# Patient Record
Sex: MALE
[De-identification: names, ages, dates, MRNs, and addresses within clinical notes are randomized per-mention and may not be internally consistent; named-entity substitution may affect disease eponyms.]

---

## 2020-03-07 ENCOUNTER — HOSPITAL ENCOUNTER (EMERGENCY)
Dept: HOSPITAL 9 - MADERS | Age: 75
Discharge: HOME | End: 2020-03-07
Payer: MEDICARE

## 2020-03-07 DIAGNOSIS — E03.9: ICD-10-CM

## 2020-03-07 DIAGNOSIS — K59.00: Primary | ICD-10-CM

## 2020-03-07 DIAGNOSIS — E11.65: ICD-10-CM

## 2020-03-07 LAB
ALBUMIN SERPL BCG-MCNC: 4.4 G/DL (ref 3.4–4.8)
ALP SERPL-CCNC: 67 U/L (ref 40–110)
ALT SERPL W P-5'-P-CCNC: 78 U/L (ref 8–55)
ANION GAP SERPL CALC-SCNC: 18 MMOL/L (ref 10–20)
AST SERPL-CCNC: 65 U/L (ref 5–34)
BACTERIA UR QL AUTO: (no result) HPF
BASOPHILS # BLD AUTO: 0.1 THOU/UL (ref 0–0.2)
BASOPHILS NFR BLD AUTO: 1.2 % (ref 0–1)
BILIRUB SERPL-MCNC: 0.4 MG/DL (ref 0.2–1.2)
BUN SERPL-MCNC: 14 MG/DL (ref 8.4–25.7)
CALCIUM SERPL-MCNC: 9.2 MG/DL (ref 7.8–10.44)
CHLORIDE SERPL-SCNC: 96 MMOL/L (ref 98–107)
CO2 SERPL-SCNC: 22 MMOL/L (ref 23–31)
CREAT CL PREDICTED SERPL C-G-VRATE: 0 ML/MIN (ref 70–130)
EOSINOPHIL # BLD AUTO: 0.1 THOU/UL (ref 0–0.7)
EOSINOPHIL NFR BLD AUTO: 1.4 % (ref 0–10)
GLOBULIN SER CALC-MCNC: 3.2 G/DL (ref 2.4–3.5)
GLUCOSE SERPL-MCNC: 412 MG/DL (ref 83–110)
GLUCOSE UR STRIP-MCNC: >=1000 MG/DL
HGB BLD-MCNC: 12.5 G/DL (ref 14–18)
LYMPHOCYTES # BLD AUTO: 1.3 THOU/UL (ref 1.2–3.4)
LYMPHOCYTES NFR BLD AUTO: 15.5 % (ref 21–51)
MCH RBC QN AUTO: 31.8 PG (ref 27–31)
MCV RBC AUTO: 96.3 FL (ref 78–98)
MONOCYTES # BLD AUTO: 0.4 THOU/UL (ref 0.11–0.59)
MONOCYTES NFR BLD AUTO: 4.7 % (ref 0–10)
NEUTROPHILS # BLD AUTO: 6.3 THOU/UL (ref 1.4–6.5)
NEUTROPHILS NFR BLD AUTO: 77.2 % (ref 42–75)
PLATELET # BLD AUTO: 225 THOU/UL (ref 130–400)
POTASSIUM SERPL-SCNC: 3.9 MMOL/L (ref 3.5–5.1)
PROT UR STRIP.AUTO-MCNC: 100 MG/DL
RBC # BLD AUTO: 3.93 MILL/UL (ref 4.7–6.1)
RBC UR QL AUTO: (no result) HPF (ref 0–3)
SODIUM SERPL-SCNC: 132 MMOL/L (ref 136–145)
WBC # BLD AUTO: 8.1 THOU/UL (ref 4.8–10.8)
WBC UR QL AUTO: (no result) HPF (ref 0–3)

## 2020-03-07 PROCEDURE — 96360 HYDRATION IV INFUSION INIT: CPT

## 2020-03-07 PROCEDURE — 80053 COMPREHEN METABOLIC PANEL: CPT

## 2020-03-07 PROCEDURE — 85025 COMPLETE CBC W/AUTO DIFF WBC: CPT

## 2020-03-07 PROCEDURE — 81015 MICROSCOPIC EXAM OF URINE: CPT

## 2020-03-07 PROCEDURE — 36416 COLLJ CAPILLARY BLOOD SPEC: CPT

## 2020-03-07 PROCEDURE — 74018 RADEX ABDOMEN 1 VIEW: CPT

## 2020-03-07 PROCEDURE — 81003 URINALYSIS AUTO W/O SCOPE: CPT

## 2020-03-07 PROCEDURE — 36415 COLL VENOUS BLD VENIPUNCTURE: CPT

## 2020-03-07 PROCEDURE — 84443 ASSAY THYROID STIM HORMONE: CPT

## 2020-03-07 NOTE — RAD
EXAM:

XR Abdomen 1 View/KUB



PROVIDED CLINICAL HISTORY:

Constipation



COMPARISON:

None



FINDINGS:

There is conspicuous colonic fecal retention, compatible with constipation. The supine nature the yoshi
dy is not sensitive for detection of pneumoperitoneum. The visualized lung bases are free of

significant opacity. No definite radiographically apparent urinary tract calculi. The abdominal bowel
 gas pattern is overall nonspecific.



IMPRESSION:

Conspicuous colonic fecal retention.



Reported By: Chirag Mariee 

Electronically Signed:  3/7/2020 2:11 PM

## 2020-06-03 ENCOUNTER — HOSPITAL ENCOUNTER (OUTPATIENT)
Dept: HOSPITAL 92 - ERS | Age: 75
Setting detail: OBSERVATION
LOS: 2 days | Discharge: HOME | End: 2020-06-05
Attending: INTERNAL MEDICINE | Admitting: INTERNAL MEDICINE
Payer: MEDICARE

## 2020-06-03 VITALS — BODY MASS INDEX: 28.2 KG/M2

## 2020-06-03 DIAGNOSIS — E78.00: ICD-10-CM

## 2020-06-03 DIAGNOSIS — E78.5: ICD-10-CM

## 2020-06-03 DIAGNOSIS — R07.9: ICD-10-CM

## 2020-06-03 DIAGNOSIS — R74.0: ICD-10-CM

## 2020-06-03 DIAGNOSIS — Z95.5: ICD-10-CM

## 2020-06-03 DIAGNOSIS — I70.208: ICD-10-CM

## 2020-06-03 DIAGNOSIS — E87.1: ICD-10-CM

## 2020-06-03 DIAGNOSIS — N28.1: ICD-10-CM

## 2020-06-03 DIAGNOSIS — Z91.14: ICD-10-CM

## 2020-06-03 DIAGNOSIS — E03.9: ICD-10-CM

## 2020-06-03 DIAGNOSIS — I10: ICD-10-CM

## 2020-06-03 DIAGNOSIS — Z23: ICD-10-CM

## 2020-06-03 DIAGNOSIS — I25.5: ICD-10-CM

## 2020-06-03 DIAGNOSIS — R94.5: ICD-10-CM

## 2020-06-03 DIAGNOSIS — I25.2: ICD-10-CM

## 2020-06-03 DIAGNOSIS — E11.9: ICD-10-CM

## 2020-06-03 DIAGNOSIS — I25.10: Primary | ICD-10-CM

## 2020-06-03 LAB
ALBUMIN SERPL BCG-MCNC: 4.4 G/DL (ref 3.4–4.8)
ALP SERPL-CCNC: 88 U/L (ref 40–110)
ALT SERPL W P-5'-P-CCNC: 93 U/L (ref 8–55)
ANION GAP SERPL CALC-SCNC: 15 MMOL/L (ref 10–20)
APTT PPP: 29.3 SEC (ref 22.9–36.1)
AST SERPL-CCNC: 87 U/L (ref 5–34)
BASOPHILS # BLD AUTO: 0 THOU/UL (ref 0–0.2)
BASOPHILS NFR BLD AUTO: 0.9 % (ref 0–1)
BILIRUB SERPL-MCNC: 0.5 MG/DL (ref 0.2–1.2)
BUN SERPL-MCNC: 9 MG/DL (ref 8.4–25.7)
CALCIUM SERPL-MCNC: 8.8 MG/DL (ref 7.8–10.44)
CHLORIDE SERPL-SCNC: 95 MMOL/L (ref 98–107)
CO2 SERPL-SCNC: 21 MMOL/L (ref 23–31)
CREAT CL PREDICTED SERPL C-G-VRATE: 0 ML/MIN (ref 70–130)
EOSINOPHIL # BLD AUTO: 0.2 THOU/UL (ref 0–0.7)
EOSINOPHIL NFR BLD AUTO: 4 % (ref 0–10)
GLOBULIN SER CALC-MCNC: 4.5 G/DL (ref 2.4–3.5)
GLUCOSE SERPL-MCNC: 362 MG/DL (ref 83–110)
HGB BLD-MCNC: 13.7 G/DL (ref 14–18)
INR PPP: 1
LIPASE SERPL-CCNC: 68 U/L (ref 8–78)
LYMPHOCYTES # BLD: 1.7 THOU/UL (ref 1.2–3.4)
LYMPHOCYTES NFR BLD AUTO: 34.4 % (ref 21–51)
MCH RBC QN AUTO: 35 PG (ref 27–31)
MCV RBC AUTO: 94.7 FL (ref 78–98)
MONOCYTES # BLD AUTO: 0.3 THOU/UL (ref 0.11–0.59)
MONOCYTES NFR BLD AUTO: 5.2 % (ref 0–10)
NEUTROPHILS # BLD AUTO: 2.7 THOU/UL (ref 1.4–6.5)
NEUTROPHILS NFR BLD AUTO: 55.6 % (ref 42–75)
PLATELET # BLD AUTO: 183 THOU/UL (ref 130–400)
POTASSIUM SERPL-SCNC: 4.1 MMOL/L (ref 3.5–5.1)
PROTHROMBIN TIME: 13 SEC (ref 12–14.7)
RBC # BLD AUTO: 3.91 MILL/UL (ref 4.7–6.1)
SODIUM SERPL-SCNC: 127 MMOL/L (ref 136–145)
TROPONIN I SERPL DL<=0.01 NG/ML-MCNC: 0.01 NG/ML (ref ?–0.03)
TROPONIN I SERPL DL<=0.01 NG/ML-MCNC: 0.03 NG/ML (ref ?–0.03)
WBC # BLD AUTO: 4.8 THOU/UL (ref 4.8–10.8)

## 2020-06-03 PROCEDURE — 96360 HYDRATION IV INFUSION INIT: CPT

## 2020-06-03 PROCEDURE — 92928 PRQ TCAT PLMT NTRAC ST 1 LES: CPT

## 2020-06-03 PROCEDURE — 85730 THROMBOPLASTIN TIME PARTIAL: CPT

## 2020-06-03 PROCEDURE — 99152 MOD SED SAME PHYS/QHP 5/>YRS: CPT

## 2020-06-03 PROCEDURE — 78452 HT MUSCLE IMAGE SPECT MULT: CPT

## 2020-06-03 PROCEDURE — G0378 HOSPITAL OBSERVATION PER HR: HCPCS

## 2020-06-03 PROCEDURE — 82550 ASSAY OF CK (CPK): CPT

## 2020-06-03 PROCEDURE — 93005 ELECTROCARDIOGRAM TRACING: CPT

## 2020-06-03 PROCEDURE — 99285 EMERGENCY DEPT VISIT HI MDM: CPT

## 2020-06-03 PROCEDURE — 36415 COLL VENOUS BLD VENIPUNCTURE: CPT

## 2020-06-03 PROCEDURE — 90471 IMMUNIZATION ADMIN: CPT

## 2020-06-03 PROCEDURE — 84443 ASSAY THYROID STIM HORMONE: CPT

## 2020-06-03 PROCEDURE — 93010 ELECTROCARDIOGRAM REPORT: CPT

## 2020-06-03 PROCEDURE — 94760 N-INVAS EAR/PLS OXIMETRY 1: CPT

## 2020-06-03 PROCEDURE — G0009 ADMIN PNEUMOCOCCAL VACCINE: HCPCS

## 2020-06-03 PROCEDURE — 83036 HEMOGLOBIN GLYCOSYLATED A1C: CPT

## 2020-06-03 PROCEDURE — 84484 ASSAY OF TROPONIN QUANT: CPT

## 2020-06-03 PROCEDURE — 93017 CV STRESS TEST TRACING ONLY: CPT

## 2020-06-03 PROCEDURE — 99153 MOD SED SAME PHYS/QHP EA: CPT

## 2020-06-03 PROCEDURE — 80053 COMPREHEN METABOLIC PANEL: CPT

## 2020-06-03 PROCEDURE — 82962 GLUCOSE BLOOD TEST: CPT

## 2020-06-03 PROCEDURE — 85610 PROTHROMBIN TIME: CPT

## 2020-06-03 PROCEDURE — 83690 ASSAY OF LIPASE: CPT

## 2020-06-03 PROCEDURE — C1876 STENT, NON-COA/NON-COV W/DEL: HCPCS

## 2020-06-03 PROCEDURE — 85025 COMPLETE CBC W/AUTO DIFF WBC: CPT

## 2020-06-03 PROCEDURE — A9500 TC99M SESTAMIBI: HCPCS

## 2020-06-03 PROCEDURE — 85347 COAGULATION TIME ACTIVATED: CPT

## 2020-06-03 PROCEDURE — 36416 COLLJ CAPILLARY BLOOD SPEC: CPT

## 2020-06-03 PROCEDURE — 71275 CT ANGIOGRAPHY CHEST: CPT

## 2020-06-03 PROCEDURE — 72191 CT ANGIOGRAPH PELV W/O&W/DYE: CPT

## 2020-06-03 PROCEDURE — 74175 CTA ABDOMEN W/CONTRAST: CPT

## 2020-06-03 PROCEDURE — 84439 ASSAY OF FREE THYROXINE: CPT

## 2020-06-03 PROCEDURE — 93458 L HRT ARTERY/VENTRICLE ANGIO: CPT

## 2020-06-03 PROCEDURE — 90670 PCV13 VACCINE IM: CPT

## 2020-06-03 PROCEDURE — 93306 TTE W/DOPPLER COMPLETE: CPT

## 2020-06-03 PROCEDURE — 96361 HYDRATE IV INFUSION ADD-ON: CPT

## 2020-06-03 RX ADMIN — INSULIN LISPRO PRN UNIT: 100 INJECTION, SOLUTION INTRAVENOUS; SUBCUTANEOUS at 21:58

## 2020-06-03 NOTE — HP
PRIMARY CARE PROVIDER:  Unknown.



CHIEF COMPLAINT:  Chest pain.



HISTORY OF PRESENT ILLNESS:  Mr. Bell is a pleasant 74-year-old gentleman, who

was seen at [facility] on Layne 3, 2020.  It appears that he

has a history of coronary artery disease and STEMI with stents placed multiple years

ago, but has been noncompliant in terms of followup. 



He presented to the emergency room with 10 days of chest pain, retrosternal, sharp,

radiating to back, progressively worsening, no known aggravating factors, but

improved with nitroglycerin paste and IV fluids.  He denies any fevers or chills.

He denies any nausea or vomiting.  He reports mild epigastric discomfort, but is

unable to characterize it further. 



REVIEW OF SYSTEMS:  All systems were reviewed and found to be negative except for

pertinent positives mentioned above. 



PAST MEDICAL HISTORY:  Hypertension; dyslipidemia; hypothyroidism; diabetes mellitus

type 2; coronary artery disease, status post PCI with stents. 



PAST SURGICAL HISTORY:  Thyroid surgery and hernia surgery.



SOCIAL HISTORY:  The patient denies tobacco use, alcohol use, or recreational drug

use. 



ALLERGIES:  NO KNOWN DRUG ALLERGIES.



CURRENT MEDICATIONS:  None.



PHYSICAL EXAMINATION:

GENERAL:  On examination, Mr. Bell is awake and alert, not in acute distress.

Blood pressure is 116/68, pulse is 70, respiratory rate 16, and oxygen saturation

96% on room air.  He is afebrile. 

EYES:  No scleral icterus.  No conjunctival pallor. 

ENT:  Moist mucosal membranes.  No oropharyngeal erythema or exudates. 

NECK:  Supple, nontender.  Trachea is midline. 

RESPIRATORY:  Accessory muscles of breathing are not active.  Chest wall movements

are symmetric bilaterally.  Lungs are clear to auscultation without wheeze, rhonchi,

or crepitations. 

CARDIOVASCULAR:  S1 and S2 are heard, regular.  Peripheral pulses palpable. 

ABDOMEN:  Soft, nontender.  Bowel sounds are heard. 

NEUROLOGIC:  Cranial nerves 2 through 12 are intact. 

MUSCULOSKELETAL:  Power is 5/5 in all 4 extremities. 

SKIN:  No rashes. 

LYMPHATIC:  No cervical lymphadenopathy. 

PSYCHIATRIC:  Normal mood, normal affect.  The patient is oriented to person, place,

and time. 



LABORATORY DATA:  Mr. Bell's labs and investigations were reviewed.

Electrocardiogram shows normal sinus rhythm, no ST changes to suggest an acute

coronary syndrome.  CT aortic dissection protocol showed moderate to high-grade

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  He had

moderate amount of retained stool within the colon and moderate distention of the

bladder.  He had a left renal cyst.  He has normal white count, normocytic anemia

with hemoglobin 13.7, normal platelet count, INR 1.0, decreased sodium of 127,

normal potassium, normal creatinine, normal total bilirubin, elevated AST of 87,

elevated ALT of 93, normal alkaline phosphatase, normal troponin I x2, and normal

lipase. 



ASSESSMENT AND PLAN:  Mr. Bell is a pleasant 74-year-old gentleman, who was seen

at [facility] on Layne 3, 2020.  His problem list includes: 

1. Chest pain:  Mr. Bell is presenting with chest pain.  His initial troponins

are normal.  Given his significant cardiac history, he will be admitted to the

hospital on observation status for telemetry monitoring and for stress test.

Further management depending on outcome of the test. 

2. Hypothyroidism:  It appears that Mr. Bell has been noncompliant with his

medications.  We will check his TSH. 

3. Hyponatremia:  Etiology unclear, could be secondary to uncontrolled

hypothyroidism.  We will provide hydration and recheck sodium level. 

4. Abnormal liver function tests:  Could be secondary to liver problems or secondary

to rhabdomyolysis.  We will check CK level.  We will also recheck liver function

tests. 

5. Medication noncompliance:  The patient has been counseled regarding medication

noncompliance. 

6. Diabetes mellitus type 2:  I will start him on Accu-Cheks and insulin sliding

scale, and check hemoglobin A1c. 



Many thanks for allowing me to participate in Mr. Bell's care.  Please feel free

to contact me with any questions or concerns. 



LEVEL OF RISK:  Moderate.



LEVEL OF COMPLEXITY:  Moderate.







Job ID:  [id number]

## 2020-06-03 NOTE — CT
CTA AORTIC DISSECTION PROTOCOL USING IV CONTRAST AND 3D REFORMATTED IMAGIN/3/20

 

COMPARISON: 

None.

 

INDICATION:

Chest and back pain for two weeks. 

 

FINDINGS: 

No acute aortic stenosis, occlusion or aneurysmal formation is demonstrated. There is an aberrant rig
ht subclavian artery. There is moderate to severe narrowing involving the origin of the aberrant righ
t subclavian artery due to atherosclerotic plaque. This is best seen on image 23 on the axial series.
 The remaining great vessel origins are widely patent. The right vertebral artery originates off the 
right common carotid artery. The celiac and SMA are widely patent. There are duplicated right renal a
rteries. There are duplicated left renal arteries. These are widely patent. The SHARON is patent. Both c
ommon iliac arteries are patent. 

 

There is a calcified granuloma in the right upper lobe. There are areas of subsegmental volume loss a
ffecting both lungs. No pleural effusion is evident. 

 

No focal hepatic lesion is evident. The spleen, pancreas, and adrenal glands are normal appearing. Th
ere is a small left renal cyst measuring 1.4 cm involving the left mid kidney. There is slight promin
ence of the renal pelves bilaterally likely reflecting extrarenal pelves. There is moderate distentio
n of the bladder. There is a moderate amount of retained stool within the colon. No free fluid or enl
arged lymph nodes are evident. 

 

There are remote appearing compression abnormalities involving L1 and L2. There are scattered degener
ative and osteoarthritic change.

 

IMPRESSION: 

1.      Moderate to high grade stenosis involving the origin of an aberrant right subclavian artery. 
 No additional hemodynamically significant stenosis, occlusion or aneurysmal formation demonstrated. 


 

2.      Moderate amount of retained stool within the colon.

 

3.      Moderate distention of the bladder. 

 

4.      Left renal cyst. 

 

POS: BH

## 2020-06-04 LAB
ALBUMIN SERPL BCG-MCNC: 4.1 G/DL (ref 3.4–4.8)
ALP SERPL-CCNC: 79 U/L (ref 40–110)
ALT SERPL W P-5'-P-CCNC: 70 U/L (ref 8–55)
ANION GAP SERPL CALC-SCNC: 19 MMOL/L (ref 10–20)
AST SERPL-CCNC: 57 U/L (ref 5–34)
BASOPHILS # BLD AUTO: 0.1 THOU/UL (ref 0–0.2)
BASOPHILS NFR BLD AUTO: 0.2 % (ref 0–1)
BILIRUB SERPL-MCNC: 0.4 MG/DL (ref 0.2–1.2)
BUN SERPL-MCNC: 10 MG/DL (ref 8.4–25.7)
CALCIUM SERPL-MCNC: 8.6 MG/DL (ref 7.8–10.44)
CHLORIDE SERPL-SCNC: 98 MMOL/L (ref 98–107)
CK SERPL-CCNC: 685 U/L (ref 30–200)
CO2 SERPL-SCNC: 17 MMOL/L (ref 23–31)
CREAT CL PREDICTED SERPL C-G-VRATE: 51 ML/MIN (ref 70–130)
EOSINOPHIL # BLD AUTO: 0.2 THOU/UL (ref 0–0.7)
EOSINOPHIL NFR BLD AUTO: 3.8 % (ref 0–10)
GLOBULIN SER CALC-MCNC: 3.7 G/DL (ref 2.4–3.5)
GLUCOSE SERPL-MCNC: 340 MG/DL (ref 83–110)
HGB BLD-MCNC: 13.2 G/DL (ref 14–18)
LYMPHOCYTES # BLD: 2.5 THOU/UL (ref 1.2–3.4)
LYMPHOCYTES NFR BLD AUTO: 34.7 % (ref 21–51)
MCH RBC QN AUTO: 35.4 PG (ref 27–31)
MCV RBC AUTO: 95.1 FL (ref 78–98)
MONOCYTES # BLD AUTO: 0.3 THOU/UL (ref 0.11–0.59)
MONOCYTES NFR BLD AUTO: 4.6 % (ref 0–10)
NEUTROPHILS # BLD AUTO: 4.3 THOU/UL (ref 1.4–6.5)
NEUTROPHILS NFR BLD AUTO: 55.8 % (ref 42–75)
PLATELET # BLD AUTO: 183 THOU/UL (ref 130–400)
POTASSIUM SERPL-SCNC: 3.8 MMOL/L (ref 3.5–5.1)
RBC # BLD AUTO: 3.73 MILL/UL (ref 4.7–6.1)
SODIUM SERPL-SCNC: 130 MMOL/L (ref 136–145)
WBC # BLD AUTO: 7.5 THOU/UL (ref 4.8–10.8)

## 2020-06-04 RX ADMIN — INSULIN LISPRO PRN UNIT: 100 INJECTION, SOLUTION INTRAVENOUS; SUBCUTANEOUS at 21:20

## 2020-06-04 RX ADMIN — INSULIN LISPRO PRN UNIT: 100 INJECTION, SOLUTION INTRAVENOUS; SUBCUTANEOUS at 05:38

## 2020-06-04 RX ADMIN — INSULIN LISPRO PRN UNIT: 100 INJECTION, SOLUTION INTRAVENOUS; SUBCUTANEOUS at 15:23

## 2020-06-04 NOTE — NM
EXAM:

NM Cardiac Stress W EF   WF



PROVIDED CLINICAL HISTORY:

Chest pain



COMPARISON:

None



FINDINGS:

This examination was performed as a pharmacological myocardial perfusion stress test.



There is diminished attenuation seen in the inferior left ventricular wall from base to apex on the s
tress and resting acquisitions. No significant reversible defect is identified. The gated images

demonstrate global hypokinesis greater involving the septum and inferior left ventricular wall. Mildl
y diminished thickening is seen in the inferior left ventricular wall. Calculated left ventricular

ejection fraction is 42%.



IMPRESSION:



1. Abnormal myocardial perfusion study with a fixed defect in the inferior left ventricular wall sugg
esting scarring. No significant reversible defect is seen to suggest ischemia.

2. Diminished LV function with decreased LVEF of 42%.



Reported By: Fish Cerrato 

Electronically Signed:  6/4/2020 3:00 PM

## 2020-06-04 NOTE — PDOC.HOSPP
- Subjective


Encounter Date: 06/04/20


Encounter Time: 18:35


Subjective: 





Pt seen for followup re: chest pain.  Reports pain is better.





- Objective


Vital Signs & Weight: 


 Vital Signs (12 hours)











  Temp Pulse Resp BP Pulse Ox


 


 06/04/20 15:20  97.5 F L  72  14  147/82 H  96


 


 06/04/20 07:40  96.8 F L  60  14  129/77  94 L


 


 06/04/20 06:55      92 L








 Weight











Weight                         154 lb 4.8 oz














I&O: 


 











 06/03/20 06/04/20 06/05/20





 06:59 06:59 06:59


 


Intake Total  1010 


 


Output Total  350 


 


Balance  660 











Result Diagrams: 


 06/04/20 03:24





 06/04/20 03:24


Additional Labs: 


 Accuchecks











  06/04/20 06/04/20 06/04/20





  17:36 15:16 05:36


 


POC Glucose  359 H  330 H  329 H














  06/03/20





  20:12


 


POC Glucose  266 H








Labs and MARs reviewed by me








EKG Reviewed by me: Yes (Tele:  NSR)





Hospitalist ROS





- Review of Systems


Respiratory: denies: cough, shortness of breath, SOB with excertion, pleuritic 

pain, wheezing


Cardiovascular: reports: chest pain.  denies: palpitations, orthopnea, 

paroxysmal noc. dyspnea, edema, light headedness


Gastrointestinal: denies: nausea, vomiting, abdominal pain, diarrhea, 

constipation, melena, hematochezia


Genitourinary: denies: dysuria, frequency, incontinence, hematuria, retention


Musculoskeletal: denies: neck pain, shoulder pain, arm pain, back pain, hand 

pain, leg pain, foot pain


Skin: denies: rash, lesions, bobby, bruising





- Medication


Medications: 


Active Medications











Generic Name Dose Route Start Last Admin





  Trade Name Freq  PRN Reason Stop Dose Admin


 


Aspirin  325 mg  06/04/20 09:00  06/04/20 08:53





  Ecotrin  PO   325 mg





  DAILY JUAN   Administration





     





     





     





     


 


Glipizide  5 mg  06/04/20 18:15  06/04/20 18:31





  Glucotrol  PO  06/04/20 21:00  5 mg





  NOW JUAN   Administration





     





     





     





     


 


Sodium Chloride  1,000 mls @ 70 mls/hr  06/03/20 20:00  06/04/20 08:52





  Normal Saline 0.9%  IV   1,000 mls





  .[id number] JUAN   Administration





     





     





     





     


 


Insulin Human Lispro  0 units  06/03/20 19:38  06/04/20 15:23





  Humalog  SC   5 unit





  .MILD SLIDING SCALE PRN   Administration





  Mild Correctional Scale   





     





     





     


 


Metformin HCl  500 mg  06/04/20 18:30  06/04/20 18:31





  Glucophage  PO  06/04/20 21:00  500 mg





  NOW JUAN   Administration





     





     





     





     














- Exam


General Appearance: awake alert


Eye: PERRL


ENT: normocephalic atraumatic, moist mucosa


Neck: supple, symmetric, no thyromegaly, no lymphadenopathy


Heart: RRR, no gallops, no rubs, normal peripheral pulses


Respiratory: CTAB, no wheezes, no rales, no ronchi, normal chest expansion


Gastrointestinal: soft, non-tender, non-distended, normal bowel sounds


Skin: no rashes


Musculoskeletal: normal tone, no muscle wasting


Psychiatric: normal affect, normal behavior, oriented to person, oriented to 

place





Hosp A/P


(1) Chest pain


Code(s): R07.9 - CHEST PAIN, UNSPECIFIED   Status: Acute   





(2) Hypothyroidism


Code(s): E03.9 - HYPOTHYROIDISM, UNSPECIFIED   Status: Chronic   





(3) Hyponatremia


Code(s): E87.1 - HYPO-OSMOLALITY AND HYPONATREMIA   Status: Chronic   





(4) Abnormal LFTs


Code(s): R94.5 - ABNORMAL RESULTS OF LIVER FUNCTION STUDIES   Status: Chronic   





(5) Cardiomyopathy


Code(s): I42.9 - CARDIOMYOPATHY, UNSPECIFIED   Status: Chronic   





(6) Noncompliance with medication regimen


Code(s): Z91.14 - PATIENT'S OTHER NONCOMPLIANCE WITH MEDICATION REGIMEN   Status

: Chronic   





- Plan





Stress test- fixed defect, EF 42%


Start synthroid


Transaminitis likely due to rhabdomyolysis


Start metformin and sulfonylurea


Consult cardiology

## 2020-06-04 NOTE — CON
DATE OF CONSULTATION:  06/04/2020



REASON FOR CONSULTATION:  Abnormal stress test and chest pain.



HISTORY OF PRESENT ILLNESS:  Mr. Bell is a pleasant 74-year-old 

gentleman from [facility], who comes to the hospital for chest pain.  He states

that for the last 10 days, he has noted retrosternal mid chest pain radiating to the

back and to the left arm.  He decided to come in as it was just getting worse.  He

has a history of coronary artery disease.  He had a STEMI apparently about 4 years

ago.  He was in [place] at that time in [place] and he states that he had a

heart catheterization and had a stent placed.  He has not followed up with anyone

since. 



On my evaluation, he is currently pain free, however, minimal exertion will cause

him to have chest pain.  Stress test was performed and showed inferior scar with a

low EF at about 40% 



PAST MEDICAL HISTORY:  

1. Hypertension.

2. Hyperlipidemia.

3. Hypothyroidism.

4. Type 2 diabetes.

5. Coronary artery disease as above.



PAST SURGICAL HISTORY:  

1. Thyroid surgery.

2. Hernia surgery.



SOCIAL HISTORY:  No alcohol, tobacco, or drugs.



ALLERGIES:  NO KNOWN DRUG ALLERGIES.



OUTPATIENT MEDICATIONS:  He is not taking any medicines at this time.



REVIEW OF SYSTEMS:  A 12-point review of systems was done and was all negative

unless stated in the history of present illness. 



PHYSICAL EXAMINATION:

VITAL SIGNS:  Temperature 97.5, pulse 72, respiratory rate 14, sats 96% on room air,

blood pressure 147/82. 

GENERAL:  Awake, alert, oriented x3, in no distress. 

HEENT:  Normocephalic and atraumatic. 

NECK:  Supple. 

LUNGS:  Clear. 

CARDIOVASCULAR:  S1, S2.  No S3 or S4.  No murmurs. 

ABDOMEN:  Soft.  Positive bowel sounds. 

EXTREMITIES:  No edema. 

SKIN:  Warm and dry.



LABORATORY DATA:  Laboratory work was reviewed.  White count of 4, hemoglobin of

13.7, hematocrit of 37, platelet count of 183.  Coags were normal.  Chemistries were

unremarkable.  Sodium was 130, potassium was 3.8, anion gap of 19, BUN of 10,

creatinine 1.28, GFR of 55, glucose was high in 300 range.  Hemoglobin A1c is 13.3.

AST 57, ALT 70, alkaline phosphatase 79.  CK was 685.  TSH was high at 74.  Free T4

is pending.  Lipase was normal.  Albumin of 4.4.  Troponins were negative x3. 



ASSESSMENT:  

1. Abnormal stress with inferior scar and reduced EF.

2. Ischemic cardiomyopathy with EF at 42%.

3. History of myocardial infarction in the past.

4. Noncompliance.

5. Severe hypothyroidism with severely elevated TSH.

6. Medication noncompliance.



PLAN:  

1. Plan on left heart catheterization tomorrow.  He has done it before.  He

remembers it very well.  He agrees to proceed.  We spoke with him about the risks

and benefits of the procedure.  Risks included, but not limited to stroke, MI,

death, bleeding, 

need for blood transfusion, limb loss, organ loss.  The patient understands and

verbalized understanding of this and agrees to proceed.  Bare metal stenting if 

needed given low compliance.

2. Further recommendations per results of coronary angiogram.







Job ID:  [id number]

## 2020-06-05 VITALS — DIASTOLIC BLOOD PRESSURE: 78 MMHG | SYSTOLIC BLOOD PRESSURE: 141 MMHG | TEMPERATURE: 97.6 F

## 2020-06-05 LAB
ALBUMIN SERPL BCG-MCNC: 4.5 G/DL (ref 3.4–4.8)
ALP SERPL-CCNC: 61 U/L (ref 40–110)
ALT SERPL W P-5'-P-CCNC: 65 U/L (ref 8–55)
ANION GAP SERPL CALC-SCNC: 16 MMOL/L (ref 10–20)
AST SERPL-CCNC: 56 U/L (ref 5–34)
BASOPHILS # BLD AUTO: 0.1 THOU/UL (ref 0–0.2)
BASOPHILS NFR BLD AUTO: 1.3 % (ref 0–1)
BILIRUB SERPL-MCNC: 0.5 MG/DL (ref 0.2–1.2)
BUN SERPL-MCNC: 9 MG/DL (ref 8.4–25.7)
CALCIUM SERPL-MCNC: 9 MG/DL (ref 7.8–10.44)
CHLORIDE SERPL-SCNC: 97 MMOL/L (ref 98–107)
CO2 SERPL-SCNC: 25 MMOL/L (ref 23–31)
CREAT CL PREDICTED SERPL C-G-VRATE: 51 ML/MIN (ref 70–130)
EOSINOPHIL # BLD AUTO: 0.4 THOU/UL (ref 0–0.7)
EOSINOPHIL NFR BLD AUTO: 3.9 % (ref 0–10)
GLOBULIN SER CALC-MCNC: 3.9 G/DL (ref 2.4–3.5)
GLUCOSE SERPL-MCNC: 201 MG/DL (ref 83–110)
HGB BLD-MCNC: 14.4 G/DL (ref 14–18)
LYMPHOCYTES # BLD: 2.8 THOU/UL (ref 1.2–3.4)
LYMPHOCYTES NFR BLD AUTO: 28.6 % (ref 21–51)
MCH RBC QN AUTO: 33.7 PG (ref 27–31)
MCV RBC AUTO: 96.5 FL (ref 78–98)
MONOCYTES # BLD AUTO: 0.5 THOU/UL (ref 0.11–0.59)
MONOCYTES NFR BLD AUTO: 4.9 % (ref 0–10)
NEUTROPHILS # BLD AUTO: 6.1 THOU/UL (ref 1.4–6.5)
NEUTROPHILS NFR BLD AUTO: 61.4 % (ref 42–75)
PLATELET # BLD AUTO: 222 THOU/UL (ref 130–400)
POTASSIUM SERPL-SCNC: 3.7 MMOL/L (ref 3.5–5.1)
RBC # BLD AUTO: 4.27 MILL/UL (ref 4.7–6.1)
SODIUM SERPL-SCNC: 134 MMOL/L (ref 136–145)
WBC # BLD AUTO: 9.9 THOU/UL (ref 4.8–10.8)

## 2020-06-05 PROCEDURE — 4A023N7 MEASUREMENT OF CARDIAC SAMPLING AND PRESSURE, LEFT HEART, PERCUTANEOUS APPROACH: ICD-10-PCS | Performed by: INTERNAL MEDICINE

## 2020-06-05 PROCEDURE — 02703DZ DILATION OF CORONARY ARTERY, ONE ARTERY WITH INTRALUMINAL DEVICE, PERCUTANEOUS APPROACH: ICD-10-PCS | Performed by: INTERNAL MEDICINE

## 2020-06-05 PROCEDURE — B2111ZZ FLUOROSCOPY OF MULTIPLE CORONARY ARTERIES USING LOW OSMOLAR CONTRAST: ICD-10-PCS | Performed by: INTERNAL MEDICINE

## 2020-06-06 NOTE — EKG
Test Reason : 

Blood Pressure : ***/*** mmHG

Vent. Rate : 073 BPM     Atrial Rate : 073 BPM

   P-R Int : 164 ms          QRS Dur : 098 ms

    QT Int : 412 ms       P-R-T Axes : 043 -14 -79 degrees

   QTc Int : 453 ms

 

Normal sinus rhythm

Inferior infarct , age undetermined

Abnormal ECG

 

Confirmed by JOSÉ MANUEL MEHTA ([id number]),  BELLA CRENSHAW ([id number]) on 6/6/2020 12:55:34 PM

 

Referred By:             Confirmed By:JOSÉ MANUEL ABBOTT

## 2020-06-06 NOTE — DIS
DATE OF ADMISSION:  06/03/2020



DATE OF DISCHARGE:  06/05/2020



PRIMARY CARE PROVIDER:  Unknown.



DISCHARGE DIAGNOSES:  

1. Coronary artery disease.

2. Chest pain, most likely secondary to coronary artery disease.

3. Medication noncompliance.

4. Uncontrolled diabetes mellitus.

5. Hypothyroidism.

6. Ischemic cardiomyopathy.

7. Hyponatremia.



CONSULTATIONS DURING THIS HOSPITALIZATION:  Cardiology, Dr. Castillo.



CONDITION OF PATIENT ON THE DAY OF DISCHARGE:  Stable. 



I assessed Mr. Bell on the day of discharge.  He denies any chest pain or

shortness of breath.  Vital signs are stable.  S1 and S2 are heard, regular.  Lungs

are clear to auscultation bilaterally. 



DISCHARGE MEDICATIONS:  

1. Aspirin 81 mg daily.

2. Lipitor 20 mg at bedtime.

3. Coreg 3.125 mg 2 times a day.

4. Plavix 75 mg daily.

5. Glipizide 5 mg 2 times a day.

6. Levothyroxine 25 mcg daily.

7. Lisinopril 2.5 mg daily.

8. Metformin 500 mg 2 times a day, to be started with evening dose on June 7, 2020.



DIET:  Heart healthy and diabetic.



ACTIVITY:  No restrictions.



DISCHARGE DESTINATION:  Home.



HOSPITAL COURSE:  Mr. Bell is a pleasant 74-year-old gentleman, who was admitted

to [facility] on Layne 3, 2020, for chest pain.  This was in

the context of medication noncompliance.  Please refer to my history and physical

note dated Layne 3, 2020, for further details.  He underwent nuclear stress test,

which showed abnormal myocardial perfusion study with a fixed defect in the inferior

left ventricular wall suggesting scarring.  He had diminished left ventricular

function with decreased LVEF of 42%.  He was seen by Cardiology Service.  On June 4th, he underwent cardiac catheterization.  He was found to have lesion in the RCA

and received bare metal stent.  He is being discharged home in a stable condition. 



His hemoglobin A1c was 13.3 during this hospitalization.  TSH was 74.33.  Free T4

was less than 0.40.  He has been restarted on Synthroid and metformin.  Glipizide

has been added.  He will likely need insulin therapy.  He has been advised to find a

primary care provider and follow up with them in 3 days time.  He has also been

advised to follow up with Cardiology Service in 10 days. 







Job ID:  [id number]